# Patient Record
(demographics unavailable — no encounter records)

---

## 2025-03-10 NOTE — REASON FOR VISIT
[FreeTextEntry1] : MARYANA MALONE is a 80  year-old F presents here for cardiac follow-up.  She has been suffering from a radiculopathy and is in chronic back pain. An L4-L5 fusion is apparently planned in the near future. Ambulatory ability has been impaired and the patient is experiencing increasing exertional fatigue as a consequence.  Other recent medical issues have included COVID in late February and resection of a basal carcinoma from the right eyelid with reconstruction.  Her medical hx is relevant for HTN, HLD, MR, elevated CT calcium score and anxiety.

## 2025-03-10 NOTE — PHYSICAL EXAM

## 2025-03-10 NOTE — HISTORY OF PRESENT ILLNESS
[FreeTextEntry1] : Patient denies any new cardiac symptoms.  Walking and use of a recumbent bike has been greatly impaired by her back pain.  Denies any exertional chest pain, palpitations or shortness of breath. Walking sometimes has been affected by sciatica pain, which has limited her quite a lot.. She denies any edema, orthopnea or PND. Home BP average is 112/65.

## 2025-03-10 NOTE — REVIEW OF SYSTEMS
[Joint Pain] : joint pain [Anxiety] : anxiety [Negative] : Heme/Lymph [Weight Gain (___ Lbs)] : no recent weight gain [Weight Loss (___ Lbs)] : no recent weight loss

## 2025-03-10 NOTE — PHYSICAL EXAM

## 2025-03-10 NOTE — ASSESSMENT
[FreeTextEntry1] :  EKG: Sinus @ 61  PRWP. No sig ST or T wave abnormality.   ECG obtained to assist in diagnosis and management of assessed problem(s).  Laboratory data  -----6/22/20---3/16/21---9/10/21---3/11/22--6/8/22--12/14/22---8/30/23--2/2024--6/11/24--9/3/24--2/24/25 Chol---185-----183--------202-------202-------170-------141--------155--------158-----154-------144-------186 HDL---- 60-------58 --------73--------73--------63----------67---------70---------68--------77--------72---------70 LDL----105------102-------107-------114-------84----------59---------68---------71--------60--------56--------100 Trig----107------131--------122-------67-------132---------74---------88--------107--------89--------81--------73  Carotid duplex 9/13/2023 No evidence of any clinically significant disease.  Echocardiogram 5/12/23: LVEF 60-65% Normal RV size and systolic fnx Mildly dilated LA Mild MR  Pharmacologic nuclear stress 10/27/2022: No evidence of ischemia. Breast attenuation artifact Mild diffuse ST segment depressions  EST 4/29/21 Exercised for 10 minutes and 5 seconds, achieving 5 METS Peak  bpm 110% MAX Peak /82 Dev, occ. PACs during exercise ECG positive with 1.0 mm of horizontal ST segment depression in leads 2,3,aVF and V4, V5 and V6 during peak stress.  4/25/22 CT calcium scoring  Total - 207 primarily in the LAD territory.  IMPRESSION: 1. HTN well-controlled on current regimen.  Home blood pressure 120/70 (average)  2. Pt activity level now impaired by the back pain.  3.  Hyperlipidemia, typically well-controlled.  Recent blood work with increased LDL reflects 7-day hiatus from atorvastatin while she was taking Paxlovid.   . No evidence of any significant carotid disease on her most recent carotid duplex.   4. Had equivocally positive EST at a low workload with  0.5 -1.0 mm ST depression. Pharmacological SPECT imaging did not show any evidence of ischemia or infarct. EKG changes during EST likely a false positive. EKG today showing sinus bradycardia, PRWP as seen on prior. No acute ischemic changes.   5. Her echocardiogram shows normal LV function, MR remains mild, and she has mild dilatation of the left atrium, possibly related to BP elevations in the past.  6.  Preop for L4-L5 fusion.  Plan: 1.  Patient has been cardiovascularly stable without any evidence of angina or heart failure.      There is no cardiac contraindication to the proposed spinal surgery.      Atenolol be taken with a sip of water the morning of the procedure and hydrochlorothiazide will be on hold.    2. Continue current CV medications at current doses. Continue periodic home BP monitoring and avoid salt. Medications being refilled through her PMD.   3. Continue statin therapy. Given her high calcium score, would recommend goal LDL of less than 70. Repeat labs through PMD.  Suspect they will be improved on steady dosing of the atorvastatin.  4. Continue to follow a heart healthy diet consisting of more vegetables, leans meats, whole grains, nuts and fruits. Avoid trans fats, saturated fats and processed meats.  Clinical follow-up in 6 months or sooner if needed.